# Patient Record
Sex: MALE | Race: WHITE | HISPANIC OR LATINO | Employment: OTHER | URBAN - METROPOLITAN AREA
[De-identification: names, ages, dates, MRNs, and addresses within clinical notes are randomized per-mention and may not be internally consistent; named-entity substitution may affect disease eponyms.]

---

## 2018-01-11 NOTE — PROGRESS NOTES
Assessment    1  Encounter for preventive health examination (V70 0) (Z00 00)   2  Encounter for screening colonoscopy (V76 51) (Z12 11)   3  Screening for cardiovascular condition (V81 2) (Z13 6)   4  Screening for lipid disorders (V77 91) (Z13 220)   5  Screening PSA (prostate specific antigen) (V76 44) (Z12 5)    Plan  Encounter for screening colonoscopy    · COLONOSCOPY; Status:Active; Requested for:31Oct2016;    · 2 - Edenilson WILSON, Jaylene Pandya  (Gastroenterology) Physician Referral  Consult  Status: Active   Requested for: [id number]  Care Summary provided  : Yes  Health Maintenance    · Always use a seat belt and shoulder strap when riding or driving a motor vehicle ;  Status:Complete;   Done: 07WVC8700   · Brush your teeth freq1 and floss at least once a day ; Status:Complete;   Done:  77RNX9352   · Decreasing the stress in your life may help your condition improve ; Status:Complete;    Done: 51SPP1772   · Drink plenty of fluids ; Status:Complete;   Done: 10TYX5193   · Eat a normal well-balanced diet ; Status:Complete;   Done: 55MGV3929   · Limit your use of alcohol to 2 drinks or cans of beer a day ; Status:Complete;   Done:  48DCX8484   · Regular aerobic exercise can help reduce stress ; Status:Complete;   Done: 02ZCY4363   · Use a sun block product with an SPF of 15 or more ; Status:Complete;   Done:  73YBK9017   · We encourage all of our patients to exercise regularly  30 minutes of exercise or physical  activity five or more days a week is recommended for children and adults ;  Status:Complete;   Done: 15GCI3358   · We recommend a colonoscopy ; Status:Complete;   Done: 61SXW7977   · We recommend routine visits to a dentist ; Status:Complete;   Done: 54HJZ2831   · We recommend that you follow these steps to lower your risk of osteoporosis  ;  Status:Complete;   Done: 26TZJ3200  Screening for cardiovascular condition, Screening for lipid disorders, Screening PSA  (prostate specific antigen)    · (1) CBC/PLT/DIFF; Status:Active; Requested for:02Nov2016;    · (1) COMPREHENSIVE METABOLIC PANEL; Status:Active; Requested for:02Nov2016;    · (1) LIPID PANEL, FASTING; Status:Active; Requested for:02Nov2016;    · (1) PSA (SCREEN) (Dx V76 44 Screen for Prostate Cancer); Status:Active; Requested  for:02Nov2016;    · (1) TSH WITH FT4 REFLEX; Status:Active; Requested for:02Nov2016;     Discussion/Summary  Impression: health maintenance visit  Currently, he eats a healthy diet and has an inadequate exercise regimen  Prostate cancer screening: the risks and benefits of prostate cancer screening were discussed  Testicular cancer screening: the risks and benefits of testicular cancer screening were discussed  Colorectal cancer screening: the risks and benefits of colorectal cancer screening were discussed and colonoscopy has been ordered  The risks and benefits of immunizations were discussed  Advice and education were given regarding nutrition, aerobic exercise and cardiovascular risk reduction  Doing well  advised regulare exercise  tdap today  refused flu shot  Chief Complaint  Patient here to establish care and for annual physical      History of Present Illness  HM, Adult Male: The patient is being seen for a health maintenance evaluation  The last health maintenance visit was month(s) ago  General Health: The patient's health since the last visit is described as good  He has regular dental visits  He denies vision problems  He denies hearing loss  Immunizations status: not up to date  Lifestyle:  He consumes a diverse and healthy diet  He does not have any weight concerns  He does not exercise regularly  (works in construction)   He consumes alcohol  He reports occasional alcohol use  Reproductive health:  the patient is sexually active  birth control is not being practiced  He denies erectile dysfunction  Screening: Additional History:  doing well  no new concerns     has not had labs or immunizations  Review of Systems    Constitutional: No fever or chills, feels well, no tiredness, no recent weight gain or weight loss  Eyes: No complaints of eye pain, no red eyes, no discharge from eyes, no itchy eyes  ENT: no complaints of earache, no hearing loss, no nosebleeds, no nasal discharge, no sore throat, no hoarseness  Cardiovascular: No complaints of slow heart rate, no fast heart rate, no chest pain, no palpitations, no leg claudication, no lower extremity  Respiratory: No complaints of shortness of breath, no wheezing, no cough, no SOB on exertion, no orthopnea or PND  Gastrointestinal: No complaints of abdominal pain, no constipation, no nausea or vomiting, no diarrhea or bloody stools  Genitourinary: No complaints of dysuria, no incontinence, no hesitancy, no nocturia, no genital lesion, no testicular pain  Musculoskeletal: No complaints of arthralgia, no myalgias, no joint swelling or stiffness, no limb pain or swelling  Integumentary: No complaints of skin rash or skin lesions, no itching, no skin wound, no dry skin  Active Problems    1  Encounter for screening colonoscopy (V76 51) (Z12 11)    Past Medical History    · Screening for cardiovascular condition (V81 2) (Z13 6)   · Screening for lipid disorders (V77 91) (Z13 220)   · Screening PSA (prostate specific antigen) (V76 44) (Z12 5)    Family History  Family History    · No pertinent family history    Social History    · Non-smoker (V49 89) (Z78 9)    Current Meds   1  No Reported Medications Recorded    Allergies    1   No Known Drug Allergies    Vitals   Recorded: 94ZEY0649 72:38EJ   Systolic 036, LUE, Sitting   Diastolic 74, LUE, Sitting   Heart Rate 64, L Radial   Pulse Quality Norm   Temperature 97 4 F, Tympanic   Height 5 ft 5 3 in   Weight 190 lb 6 08 oz   BMI Calculated 31 39   BSA Calculated 1 94     Physical Exam    Constitutional   General appearance: No acute distress, well appearing and well nourished  Head and Face   Head and face: Normal     Palpation of the face and sinuses: No sinus tenderness  Eyes   Conjunctiva and lids: No erythema, swelling or discharge  Pupils and irises: Equal, round, reactive to light  Ears, Nose, Mouth, and Throat   External inspection of ears and nose: Normal     Otoscopic examination: Tympanic membranes translucent with normal light reflex  Canals patent without erythema  Lips, teeth, and gums: Normal, good dentition  Oropharynx: Normal with no erythema, edema, exudate or lesions  Neck   Neck: Supple, symmetric, trachea midline, no masses  Thyroid: Normal, no thyromegaly  Pulmonary   Respiratory effort: No increased work of breathing or signs of respiratory distress  Palpation of chest: Normal     Auscultation of lungs: Clear to auscultation  Cardiovascular   Palpation of heart: Normal PMI, no thrills  Auscultation of heart: Normal rate and rhythm, normal S1 and S2, no murmurs  Pedal pulses: 2+ bilaterally  Peripheral vascular exam: Normal     Examination of extremities for edema and/or varicosities: Normal     Abdomen   Abdomen: Non-tender, no masses  Liver and spleen: No hepatomegaly or splenomegaly  Lymphatic   Palpation of lymph nodes in neck: No lymphadenopathy  Musculoskeletal   Gait and station: Normal     Muscle strength/tone: Normal     Neurologic   Cortical function: Normal mental status  Coordination: Normal finger to nose and heel to shin  Psychiatric   Orientation to person, place and time: Normal     Mood and affect: Normal        Results/Data  PHQ-2 Adult Depression Screening [id number] 03:27PM User, Keeley     Test Name Result Flag Reference   PHQ-2 Adult Depression Score 0     Over the last two weeks, how often have you been bothered by any of the following problems?   Little interest or pleasure in doing things: Not at all - 0  Feeling down, depressed, or hopeless: Not at all - 0   PHQ-2 Adult Depression Screening Negative         Signatures   Electronically signed by : Woodrow Dennison MD; Nov 2 2016  3:41PM EST                       (Author)

## 2018-01-12 NOTE — RESULT NOTES
Verified Results  (1) CBC/PLT/DIFF [id number] 12:00AM Francisco J Kim     Test Name Result Flag Reference   WBC 6 0 x10E3/uL  3 4-10 8   RBC 5 43 x10E6/uL  4 14-5 80   Hemoglobin 16 5 g/dL  12 6-17 7   Hematocrit 49 3 %  37 5-51 0   MCV 91 fL  79-97   MCH 30 4 pg  26 6-33 0   MCHC 33 5 g/dL  31 5-35 7   RDW 12 7 %  12 3-15 4   Platelets 695 F29C3/AY  150-379   Neutrophils 50 %     Lymphs 33 %     Monocytes 9 %     Eos 7 %     Basos 1 %     Neutrophils (Absolute) 3 0 x10E3/uL  1 4-7 0   Lymphs (Absolute) 2 0 x10E3/uL  0 7-3 1   Monocytes(Absolute) 0 5 x10E3/uL  0 1-0 9   Eos (Absolute) 0 4 x10E3/uL  0 0-0 4   Baso (Absolute) 0 1 x10E3/uL  0 0-0 2   Immature Granulocytes 0 %     Immature Grans (Abs) 0 0 x10E3/uL  0 0-0 1     (1) COMPREHENSIVE METABOLIC PANEL [id number] 92:37YL Francisco J Kim     Test Name Result Flag Reference   Glucose, Serum 105 mg/dL H 65-99   BUN 15 mg/dL  6-24   Creatinine, Serum 0 98 mg/dL  0 76-1 27   eGFR If NonAfricn Am 89 mL/min/1 73  >59   eGFR If Africn Am 103 mL/min/1 73  >59   BUN/Creatinine Ratio 15  9-20   Sodium, Serum 143 mmol/L  136-144   Potassium, Serum 5 0 mmol/L  3 5-5 2   Chloride, Serum 103 mmol/L     Carbon Dioxide, Total 25 mmol/L  18-29   Calcium, Serum 9 5 mg/dL  8 7-10 2   Protein, Total, Serum 7 1 g/dL  6 0-8 5   Albumin, Serum 4 4 g/dL  3 5-5 5   Globulin, Total 2 7 g/dL  1 5-4 5   A/G Ratio 1 6  1 1-2 5   Bilirubin, Total 0 5 mg/dL  0 0-1 2   Alkaline Phosphatase, S 50 IU/L     AST (SGOT) 26 IU/L  0-40   ALT (SGPT) 23 IU/L  0-44     (1) LIPID PANEL, FASTING [id number] 12:00AM Francisco J Kim     Test Name Result Flag Reference   Cholesterol, Total 189 mg/dL  100-199   Triglycerides 108 mg/dL  0-149   HDL Cholesterol 66 mg/dL  >39   According to ATP-III Guidelines, HDL-C >59 mg/dL is considered a  negative risk factor for CHD  VLDL Cholesterol Abhi 22 mg/dL  5-40   LDL Cholesterol Calc 101 mg/dL H 0-99   T   Chol/HDL Ratio 2 9 ratio units 0 0-5 0   T  Chol/HDL Ratio                                                             Men  Women                                               1/2 Avg  Risk  3 4    3 3                                                   Avg Risk  5 0    4 4                                                2X Avg  Risk  9 6    7 1                                                3X Avg  Risk 23 4   11 0     (1) PSA (SCREEN) (Dx V76 44 Screen for Prostate Cancer) [id number] 12:00AM Francisco J Kim     Test Name Result Flag Reference   Prostate Specific Ag, Serum 0 8 ng/mL  0 0-4 0   Roche ECLIA methodology  According to the American Urological Association, Serum PSA should  decrease and remain at undetectable levels after radical  prostatectomy  The AUA defines biochemical recurrence as an initial  PSA value 0 2 ng/mL or greater followed by a subsequent confirmatory  PSA value 0 2 ng/mL or greater  Values obtained with different assay methods or kits cannot be used  interchangeably  Results cannot be interpreted as absolute evidence  of the presence or absence of malignant disease  (1) TSH WITH FT4 REFLEX [id number] 12:00AM JudyFrancisco J     Test Name Result Flag Reference   TSH 1 870 uIU/mL  0 450-4 500       Discussion/Summary    please call    labs are good except slightly elevated blood sugar consistent with prediabetes  recommned watching his die ( low carbohydrate) to prevent diabetes       pt aware ems 11/7/2016

## 2018-03-29 ENCOUNTER — OFFICE VISIT (OUTPATIENT)
Dept: FAMILY MEDICINE CLINIC | Facility: HOSPITAL | Age: 53
End: 2018-03-29
Payer: COMMERCIAL

## 2018-03-29 VITALS
WEIGHT: 191.4 LBS | RESPIRATION RATE: 14 BRPM | SYSTOLIC BLOOD PRESSURE: 136 MMHG | HEART RATE: 64 BPM | HEIGHT: 65 IN | TEMPERATURE: 97.7 F | BODY MASS INDEX: 31.89 KG/M2 | DIASTOLIC BLOOD PRESSURE: 88 MMHG

## 2018-03-29 DIAGNOSIS — K14.8 LESION OF TONGUE: ICD-10-CM

## 2018-03-29 DIAGNOSIS — Z20.5 EXPOSURE TO HEPATITIS C: Primary | ICD-10-CM

## 2018-03-29 PROCEDURE — 99213 OFFICE O/P EST LOW 20 MIN: CPT | Performed by: FAMILY MEDICINE

## 2018-03-29 PROCEDURE — 3008F BODY MASS INDEX DOCD: CPT | Performed by: FAMILY MEDICINE
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
## 2018-03-31 LAB
ALBUMIN SERPL-MCNC: 4.5 G/DL (ref 3.5–5.5)
ALBUMIN/GLOB SERPL: 1.8 {RATIO} (ref 1.2–2.2)
ALP SERPL-CCNC: 48 IU/L (ref 39–117)
ALT SERPL-CCNC: 32 IU/L (ref 0–44)
AST SERPL-CCNC: 26 IU/L (ref 0–40)
BILIRUB SERPL-MCNC: 0.5 MG/DL (ref 0–1.2)
BUN SERPL-MCNC: 15 MG/DL (ref 6–24)
BUN/CREAT SERPL: 15 (ref 9–20)
CALCIUM SERPL-MCNC: 9.4 MG/DL (ref 8.7–10.2)
CHLORIDE SERPL-SCNC: 101 MMOL/L (ref 96–106)
CO2 SERPL-SCNC: 24 MMOL/L (ref 18–29)
CREAT SERPL-MCNC: 0.98 MG/DL (ref 0.76–1.27)
GLOBULIN SER-MCNC: 2.5 G/DL (ref 1.5–4.5)
GLUCOSE SERPL-MCNC: 131 MG/DL (ref 65–99)
HBV SURFACE AG SERPL QL IA: NEGATIVE
HBV SURFACE AG SERPL QL IA: NEGATIVE
HCV AB S/CO SERPL IA: 0.1 S/CO RATIO (ref 0–0.9)
POTASSIUM SERPL-SCNC: 4.2 MMOL/L (ref 3.5–5.2)
PROT SERPL-MCNC: 7 G/DL (ref 6–8.5)
SL AMB EGFR AFRICAN AMERICAN: 101 ML/MIN/1.73
SL AMB EGFR NON AFRICAN AMERICAN: 88 ML/MIN/1.73
SODIUM SERPL-SCNC: 141 MMOL/L (ref 134–144)

## 2018-04-03 LAB
HGB FRACT BLD-IMP: NEGATIVE
HIV1 AB SERPL QL IA: NEGATIVE
SL AMB FINAL INTERPRETATION: NORMAL
SL AMB HIV 1 RNA QUALITATIVE: NEGATIVE
SL AMB HIV 2 AB: NEGATIVE

## 2019-07-03 ENCOUNTER — TELEPHONE (OUTPATIENT)
Dept: FAMILY MEDICINE CLINIC | Facility: HOSPITAL | Age: 54
End: 2019-07-03

## 2019-10-07 ENCOUNTER — TELEPHONE (OUTPATIENT)
Dept: FAMILY MEDICINE CLINIC | Facility: HOSPITAL | Age: 54
End: 2019-10-07

## 2019-12-16 ENCOUNTER — TELEPHONE (OUTPATIENT)
Dept: INTERNAL MEDICINE CLINIC | Facility: CLINIC | Age: 54
End: 2019-12-16

## 2019-12-16 NOTE — TELEPHONE ENCOUNTER
Patient called in regards to scheduling TCM - Admitted to [facility] on 11-8-2019 for CHF - D/C on 12/15/2019 - Was seen by Dr Brennon Kunz and does plan on establishing care but would like to see her  Can you speak w/ Dr Brennon Kunz and see when we can get patient in for an appointment? Thank you!

## 2020-09-05 ENCOUNTER — HOSPITAL ENCOUNTER (EMERGENCY)
Facility: HOSPITAL | Age: 55
Discharge: HOME | End: 2020-09-05
Attending: EMERGENCY MEDICINE
Payer: COMMERCIAL

## 2020-09-05 ENCOUNTER — APPOINTMENT (EMERGENCY)
Dept: CT IMAGING | Facility: HOSPITAL | Age: 55
End: 2020-09-05
Payer: COMMERCIAL

## 2020-09-05 VITALS
WEIGHT: 189 LBS | SYSTOLIC BLOOD PRESSURE: 156 MMHG | BODY MASS INDEX: 31.49 KG/M2 | OXYGEN SATURATION: 97 % | TEMPERATURE: 98.2 F | RESPIRATION RATE: 18 BRPM | HEIGHT: 65 IN | DIASTOLIC BLOOD PRESSURE: 91 MMHG | HEART RATE: 88 BPM

## 2020-09-05 DIAGNOSIS — S01.112A LACERATION OF LEFT EYEBROW: ICD-10-CM

## 2020-09-05 DIAGNOSIS — S00.83XA FACIAL CONTUSION: Primary | ICD-10-CM

## 2020-09-05 PROCEDURE — 12011 RPR F/E/E/N/L/M 2.5 CM/<: CPT | Performed by: PHYSICIAN ASSISTANT

## 2020-09-05 PROCEDURE — G1004 CDSM NDSC: HCPCS

## 2020-09-05 PROCEDURE — 99283 EMERGENCY DEPT VISIT LOW MDM: CPT

## 2020-09-05 PROCEDURE — 99284 EMERGENCY DEPT VISIT MOD MDM: CPT | Performed by: PHYSICIAN ASSISTANT

## 2020-09-05 PROCEDURE — 70486 CT MAXILLOFACIAL W/O DYE: CPT

## 2020-09-05 RX ORDER — GINSENG 100 MG
1 CAPSULE ORAL ONCE
Status: COMPLETED | OUTPATIENT
Start: 2020-09-05 | End: 2020-09-05

## 2020-09-05 RX ORDER — LIDOCAINE HYDROCHLORIDE 10 MG/ML
5 INJECTION, SOLUTION EPIDURAL; INFILTRATION; INTRACAUDAL; PERINEURAL ONCE
Status: COMPLETED | OUTPATIENT
Start: 2020-09-05 | End: 2020-09-05

## 2020-09-05 RX ADMIN — LIDOCAINE HYDROCHLORIDE 5 ML: 10 INJECTION, SOLUTION EPIDURAL; INFILTRATION; INTRACAUDAL; PERINEURAL at 17:44

## 2020-09-05 RX ADMIN — BACITRACIN 1 SMALL APPLICATION: 500 OINTMENT TOPICAL at 17:44

## 2020-09-05 NOTE — ED PROVIDER NOTES
History  Chief Complaint   Patient presents with    Facial Laceration     pt reports that he was cuttnig a tree down and the crow bar hit him in the face cutting open his left eyebrow  left sided blurriness when it first happened  Patient is a 53 y/o M that presents to the ED with laceration left eyebrow that occurred 1 hour ago  He states he was cutting down a tree and hit himself in the head with a crowbar  He states his left eye seemed a little blurry at first, but has improved  No LOC  No other injuries  Last tetanus was in 2016  He has pain over his orbit  History provided by:  Patient  Facial Injury   Mechanism of injury:  Direct blow  Location:  Face  Time since incident:  1 hour  Pain details:     Quality:  Aching    Severity:  Moderate    Duration:  1 hour    Timing:  Constant    Progression:  Unchanged  Foreign body present:  No foreign bodies  Relieved by:  Nothing  Worsened by:  Nothing  Ineffective treatments:  None tried  Associated symptoms: headaches    Associated symptoms: no altered mental status, no congestion, no difficulty breathing, no double vision, no loss of consciousness, no nausea, no neck pain, no rhinorrhea and no vomiting        None       History reviewed  No pertinent past medical history  History reviewed  No pertinent surgical history  Family History   Problem Relation Age of Onset    No Known Problems Family      I have reviewed and agree with the history as documented  E-Cigarette/Vaping     E-Cigarette/Vaping Substances     Social History     Tobacco Use    Smoking status: Never Smoker    Smokeless tobacco: Never Used   Substance Use Topics    Alcohol use: No    Drug use: No       Review of Systems   Constitutional: Negative for chills and fever  HENT: Negative for congestion and rhinorrhea  Eyes: Negative for double vision, photophobia, pain, redness and visual disturbance  Respiratory: Negative for cough and shortness of breath  Cardiovascular: Negative for chest pain and leg swelling  Gastrointestinal: Negative for abdominal pain, nausea and vomiting  Musculoskeletal: Negative for neck pain  Skin: Positive for wound  Neurological: Positive for headaches  Negative for dizziness, loss of consciousness, weakness and numbness  Psychiatric/Behavioral: Negative for confusion and decreased concentration  All other systems reviewed and are negative  Physical Exam  Physical Exam  Vitals signs and nursing note reviewed  Constitutional:       Appearance: Normal appearance  He is well-developed and well-groomed  He is not ill-appearing  HENT:      Head: Normocephalic  Laceration present  Jaw: There is normal jaw occlusion  No tenderness, swelling or pain on movement  Right Ear: Hearing normal       Left Ear: Hearing normal       Nose: Nose normal       Mouth/Throat:      Mouth: Mucous membranes are moist       Pharynx: Oropharynx is clear  Eyes:      General: Lids are normal       Extraocular Movements: Extraocular movements intact  Conjunctiva/sclera: Conjunctivae normal       Pupils: Pupils are equal, round, and reactive to light  Funduscopic exam:     Right eye: No hemorrhage  Left eye: No hemorrhage  Neck:      Musculoskeletal: Normal range of motion and neck supple  No spinous process tenderness or muscular tenderness  Cardiovascular:      Rate and Rhythm: Normal rate and regular rhythm  Heart sounds: Normal heart sounds  No murmur  Pulmonary:      Effort: Pulmonary effort is normal       Breath sounds: Normal breath sounds  No wheezing or rhonchi  Abdominal:      General: Abdomen is flat  Palpations: Abdomen is soft  Tenderness: There is no abdominal tenderness  Musculoskeletal: Normal range of motion  General: No swelling  Skin:     General: Skin is warm and dry  Findings: Laceration (1 5cm vertical laceration to left eyebrow  ) and wound present  Neurological:      General: No focal deficit present  Mental Status: He is alert and oriented to person, place, and time  GCS: GCS eye subscore is 4  GCS verbal subscore is 5  GCS motor subscore is 6  Cranial Nerves: Cranial nerves are intact  Sensory: Sensation is intact  Motor: Motor function is intact  Gait: Gait is intact  Psychiatric:         Mood and Affect: Mood normal          Behavior: Behavior is cooperative  Vital Signs  ED Triage Vitals [09/05/20 1740]   Temperature Pulse Respirations Blood Pressure SpO2   98 2 °F (36 8 °C) 92 18 (!) 186/93 98 %      Temp Source Heart Rate Source Patient Position - Orthostatic VS BP Location FiO2 (%)   Temporal Monitor Sitting Right arm --      Pain Score       --           Vitals:    09/05/20 1740 09/05/20 1745   BP: (!) 186/93 156/91   Pulse: 92 88   Patient Position - Orthostatic VS: Sitting Lying         Visual Acuity  Visual Acuity      Most Recent Value   Visual acuity R eye is  20/20   Visual acuity Left eye is  20/50   Visual acuity in both eyes is  20/20   Wearing corrective eyewear/lenses? No          ED Medications  Medications   lidocaine (PF) (XYLOCAINE-MPF) 1 % injection 5 mL (5 mL Infiltration Given 9/5/20 1744)   bacitracin topical ointment 1 small application (1 small application Topical Given 9/5/20 1744)       Diagnostic Studies  Results Reviewed     None                 CT facial bones without contrast   Final Result by Renea Primrose, MD (09/05 1832)      Left supraorbital soft tissue swelling  No underlying orbital fracture  No globe or retrobulbar injury  Workstation performed: [id number]                    Procedures  Laceration repair    Date/Time: 9/5/2020 6:10 PM  Performed by: Suman Restrepo PA-C  Authorized by: Suman Restrepo PA-C   Consent: Verbal consent obtained    Risks and benefits: risks, benefits and alternatives were discussed  Consent given by: patient  Body area: head/neck  Location details: left eyebrow  Laceration length: 1 5 cm  Foreign bodies: no foreign bodies  Tendon involvement: none  Nerve involvement: none  Vascular damage: no  Anesthesia: local infiltration    Anesthesia:  Local Anesthetic: lidocaine 1% without epinephrine  Anesthetic total: 2 mL      Procedure Details:  Preparation: Patient was prepped and draped in the usual sterile fashion  Irrigation solution: saline  Irrigation method: tap  Amount of cleaning: standard  Debridement: none  Degree of undermining: none  Skin closure: 6-0 nylon  Number of sutures: 5  Technique: simple  Approximation: close  Approximation difficulty: simple  Dressing: antibiotic ointment  Patient tolerance: Patient tolerated the procedure well with no immediate complications               ED Course       US AUDIT      Most Recent Value   Initial Alcohol Screen: US AUDIT-C    1  How often do you have a drink containing alcohol?  0 Filed at: 09/05/2020 1740   2  How many drinks containing alcohol do you have on a typical day you are drinking? 0 Filed at: 09/05/2020 1740   3a  Male UNDER 65: How often do you have five or more drinks on one occasion? 0 Filed at: 09/05/2020 1740   3b  FEMALE Any Age, or MALE 65+: How often do you have 4 or more drinks on one occassion? 0 Filed at: 09/05/2020 1740   Audit-C Score  0 Filed at: 09/05/2020 1740                  JAYANT/DAST-10      Most Recent Value   How many times in the past year have you    Used an illegal drug or used a prescription medication for non-medical reasons? Never Filed at: 09/05/2020 1740                                MDM  Number of Diagnoses or Management Options  Facial contusion: new and requires workup  Laceration of left eyebrow: new and does not require workup  Diagnosis management comments: Patient with left eye contusion, no pain to eye, but has pain to orbit  Will order CT scan to r/o fracture  Tetanus is UTD, will suture laceration    Will refer patient to ophthalmology due to blurred vision for recheck  Amount and/or Complexity of Data Reviewed  Tests in the radiology section of CPT®: ordered and reviewed    Patient Progress  Patient progress: stable        Disposition  Final diagnoses:   Facial contusion   Laceration of left eyebrow     Time reflects when diagnosis was documented in both MDM as applicable and the Disposition within this note     Time User Action Codes Description Comment    9/5/2020  6:39 PM Maurice Richard [place] Facial contusion     9/5/2020  6:39 PM Maurice Richard [[id number]] Laceration of left eyebrow       ED Disposition     ED Disposition Condition Date/Time Comment    Discharge Stable Sat Sep 5, 2020  6:39 PM Josefa Rodriguez discharge to home/self care  Follow-up Information     Follow up With Specialties Details Why Contact Info    Mayela Coronado MD Ophthalmology Call in 2 days For recheck [phone]  12 [address] [address]            There are no discharge medications for this patient  No discharge procedures on file      PDMP Review     None          ED Provider  Electronically Signed by           Luis Ulrich PA-C  09/05/20 3154

## 2020-09-05 NOTE — DISCHARGE INSTRUCTIONS
Rest, ice, elevate head of bed  Tylenol for discomfort  If change in behavior or vomiting return to ER  Keep wound dry for next 24 hours, then clean daily with soap and water and apply antibiotic ointment    Follow up with family doctor in 5-6 days for suture removal

## 2020-09-10 ENCOUNTER — OFFICE VISIT (OUTPATIENT)
Dept: FAMILY MEDICINE CLINIC | Facility: HOSPITAL | Age: 55
End: 2020-09-10
Payer: COMMERCIAL

## 2020-09-10 VITALS
TEMPERATURE: 96.1 F | WEIGHT: 195 LBS | SYSTOLIC BLOOD PRESSURE: 132 MMHG | DIASTOLIC BLOOD PRESSURE: 80 MMHG | BODY MASS INDEX: 32.49 KG/M2 | HEIGHT: 65 IN | HEART RATE: 54 BPM

## 2020-09-10 DIAGNOSIS — Z48.02 ENCOUNTER FOR REMOVAL OF SUTURES: Primary | ICD-10-CM

## 2020-09-10 DIAGNOSIS — S01.81XS: ICD-10-CM

## 2020-09-10 PROCEDURE — 99213 OFFICE O/P EST LOW 20 MIN: CPT | Performed by: FAMILY MEDICINE

## 2020-09-10 PROCEDURE — 3725F SCREEN DEPRESSION PERFORMED: CPT | Performed by: FAMILY MEDICINE

## 2020-09-10 PROCEDURE — 1036F TOBACCO NON-USER: CPT | Performed by: FAMILY MEDICINE

## 2020-09-10 NOTE — PROGRESS NOTES
Suture removal    Date/Time: 9/10/2020 9:24 AM  Performed by: Marie Valencia MD  Authorized by: Marie Valencia MD     Patient location:  Bedside  Consent:     Consent obtained:  Verbal    Consent given by:  Patient    Risks discussed:  Bleeding    Alternatives discussed:  No treatment and delayed treatment  Universal protocol:     Procedure explained and questions answered to patient or proxy's satisfaction: yes    Location:     Laterality:  Left    Location:  Head/neck    Head/neck location:  Eyebrow  Procedure details: Tools used:  Scissors and tweezers    Wound appearance:  No sign(s) of infection, good wound healing and clean    Number of sutures removed:  5  Post-procedure details:     Post-removal:  Antibiotic ointment applied    Patient tolerance of procedure:   Tolerated well, no immediate complications

## 2020-09-10 NOTE — PROGRESS NOTES
Assessment/Plan:      Problem List Items Addressed This Visit     None      Visit Diagnoses     Encounter for removal of sutures    -  Primary    Laceration of skin of face, sequela             Sutures removed without complication  Subjective:   Chief Complaint   Patient presents with    Suture / Staple Removal        Patient ID: Josefa Rodriguez is a 54 y o  male  5 days ago patient was working in the yard and cutting a tree  Crowbar hit his left eyebrow causing a laceration  Was seen in the ER and was repaired  No fever, no chills  No headache, no dizziness  No blurry of vision  No balance problems  No discharge, no pain  The following portions of the patient's history were reviewed and updated as appropriate: allergies, current medications, past family history, past medical history, past social history, past surgical history and problem list     Review of Systems   Constitutional: Negative for activity change, appetite change, fatigue and fever  Objective:  Vitals:    09/10/20 0902   BP: 132/80   Pulse: (!) 54   Temp: (!) 96 1 °F (35 6 °C)   Weight: 88 5 kg (195 lb)   Height: 5' 5" (1 651 m)     BP Readings from Last 6 Encounters:   09/10/20 132/80   09/05/20 156/91   03/29/18 136/88   11/02/16 134/74      Wt Readings from Last 6 Encounters:   09/10/20 88 5 kg (195 lb)   09/05/20 85 7 kg (189 lb)   03/29/18 86 8 kg (191 lb 6 4 oz)   11/02/16 86 4 kg (190 lb 6 1 oz)             Physical Exam  Vitals signs reviewed  Constitutional:       Appearance: Normal appearance  Skin:     Comments: Sutures in place  Skin well coaptated  No tenderness, no fluctuance  Sutures were loose  Neurological:      Mental Status: He is alert  No visits with results within 6 Month(s) from this visit     Latest known visit with results is:   Office Visit on 03/29/2018   Component Date Value Ref Range Status    HBsAg Screen 03/30/2018 Negative  Negative Final  Glucose, Random 03/30/2018 131* 65 - 99 mg/dL Final    BUN 03/30/2018 15  6 - 24 mg/dL Final    Creatinine 03/30/2018 0 98  0 76 - 1 27 mg/dL Final    eGFR Non African American 03/30/2018 88  >59 mL/min/1 73 Final    eGFR African American 03/30/2018 101  >59 mL/min/1 73 Final    SL AMB BUN/CREATININE RATIO 03/30/2018 15  9 - 20 Final    Sodium 03/30/2018 141  134 - 144 mmol/L Final    Potassium 03/30/2018 4 2  3 5 - 5 2 mmol/L Final    Chloride 03/30/2018 101  96 - 106 mmol/L Final    CO2 03/30/2018 24  18 - 29 mmol/L Final    CALCIUM 03/30/2018 9 4  8 7 - 10 2 mg/dL Final    Protein, Total 03/30/2018 7 0  6 0 - 8 5 g/dL Final    Albumin 03/30/2018 4 5  3 5 - 5 5 g/dL Final    Globulin, Total 03/30/2018 2 5  1 5 - 4 5 g/dL Final    Albumin/Globulin Ratio 03/30/2018 1 8  1 2 - 2 2 Final    TOTAL BILIRUBIN 03/30/2018 0 5  0 0 - 1 2 mg/dL Final    Alk Phos Isoenzymes 03/30/2018 48  39 - 117 IU/L Final    AST 03/30/2018 26  0 - 40 IU/L Final    ALT 03/30/2018 32  0 - 44 IU/L Final    HEP C AB 03/30/2018 0 1  0 0 - 0 9 s/co ratio Final    Comment:                                   Negative:     < 0 8                               Indeterminate: 0 8 - 0 9                                    Positive:     > 0 9   The CDC recommends that a positive HCV antibody result   be followed up with a HCV Nucleic Acid Amplification   test (416259)   HBsAg Screen 03/30/2018 Negative  Negative Final    HIV-1 Antibody 03/30/2018 Negative  Negative Final    HIV 2 Ab 03/30/2018 Negative  Negative Final    Interpretation 03/30/2018 Negative   Final    See RNA Reflex   HIV 1 RNA Qualitative 03/30/2018 Negative  Negative Final    Negative for HIV-1 RNA    Final Interpretation 03/30/2018 Comment   Final    Comment: HIV antibodies were not confirmed and HIV 1 RNA was not detected  No  laboratory evidence of HIV 1 infection  Follow-up testing for HIV 2  should be performed if clinically indicated

## 2020-12-24 ENCOUNTER — HOSPITAL ENCOUNTER (EMERGENCY)
Facility: HOSPITAL | Age: 55
Discharge: HOME | End: 2020-12-24
Attending: EMERGENCY MEDICINE | Admitting: EMERGENCY MEDICINE
Payer: COMMERCIAL

## 2020-12-24 VITALS
SYSTOLIC BLOOD PRESSURE: 161 MMHG | HEART RATE: 56 BPM | WEIGHT: 189 LBS | RESPIRATION RATE: 18 BRPM | TEMPERATURE: 98.3 F | OXYGEN SATURATION: 96 % | BODY MASS INDEX: 31.45 KG/M2 | DIASTOLIC BLOOD PRESSURE: 93 MMHG

## 2020-12-24 DIAGNOSIS — I10 HYPERTENSION: ICD-10-CM

## 2020-12-24 DIAGNOSIS — R68.83 CHILLS: ICD-10-CM

## 2020-12-24 DIAGNOSIS — Z20.822 SUSPECTED COVID-19 VIRUS INFECTION: Primary | ICD-10-CM

## 2020-12-24 DIAGNOSIS — R11.0 NAUSEA: ICD-10-CM

## 2020-12-24 PROCEDURE — 99283 EMERGENCY DEPT VISIT LOW MDM: CPT

## 2020-12-24 PROCEDURE — 87637 SARSCOV2&INF A&B&RSV AMP PRB: CPT | Performed by: EMERGENCY MEDICINE

## 2020-12-24 PROCEDURE — 99282 EMERGENCY DEPT VISIT SF MDM: CPT | Performed by: EMERGENCY MEDICINE

## 2020-12-25 LAB
FLUAV RNA NPH QL NAA+PROBE: NOT DETECTED
FLUBV RNA NPH QL NAA+PROBE: NOT DETECTED
RSV RNA NPH QL NAA+PROBE: NOT DETECTED
SARS-COV-2 RNA NPH QL NAA+PROBE: NOT DETECTED

## 2021-03-29 DIAGNOSIS — Z23 ENCOUNTER FOR IMMUNIZATION: ICD-10-CM

## 2021-04-01 ENCOUNTER — IMMUNIZATIONS (OUTPATIENT)
Dept: FAMILY MEDICINE CLINIC | Facility: HOSPITAL | Age: 56
End: 2021-04-01

## 2021-04-01 DIAGNOSIS — Z23 ENCOUNTER FOR IMMUNIZATION: Primary | ICD-10-CM

## 2021-04-01 PROCEDURE — 91300 SARS-COV-2 / COVID-19 MRNA VACCINE (PFIZER-BIONTECH) 30 MCG: CPT

## 2021-04-01 PROCEDURE — 0001A SARS-COV-2 / COVID-19 MRNA VACCINE (PFIZER-BIONTECH) 30 MCG: CPT

## 2021-04-22 ENCOUNTER — IMMUNIZATIONS (OUTPATIENT)
Dept: FAMILY MEDICINE CLINIC | Facility: HOSPITAL | Age: 56
End: 2021-04-22

## 2021-04-22 DIAGNOSIS — Z23 ENCOUNTER FOR IMMUNIZATION: Primary | ICD-10-CM

## 2021-04-22 PROCEDURE — 0002A SARS-COV-2 / COVID-19 MRNA VACCINE (PFIZER-BIONTECH) 30 MCG: CPT

## 2021-04-22 PROCEDURE — 91300 SARS-COV-2 / COVID-19 MRNA VACCINE (PFIZER-BIONTECH) 30 MCG: CPT

## 2022-06-27 ENCOUNTER — TELEPHONE (OUTPATIENT)
Dept: FAMILY MEDICINE CLINIC | Facility: HOSPITAL | Age: 57
End: 2022-06-27

## 2022-08-10 ENCOUNTER — OFFICE VISIT (OUTPATIENT)
Dept: FAMILY MEDICINE CLINIC | Facility: HOSPITAL | Age: 57
End: 2022-08-10
Payer: COMMERCIAL

## 2022-08-10 VITALS
WEIGHT: 189 LBS | BODY MASS INDEX: 31.49 KG/M2 | HEIGHT: 65 IN | DIASTOLIC BLOOD PRESSURE: 78 MMHG | TEMPERATURE: 96.2 F | HEART RATE: 57 BPM | SYSTOLIC BLOOD PRESSURE: 132 MMHG

## 2022-08-10 DIAGNOSIS — Z00.00 ANNUAL PHYSICAL EXAM: Primary | ICD-10-CM

## 2022-08-10 DIAGNOSIS — Z13.6 SCREENING FOR CARDIOVASCULAR CONDITION: ICD-10-CM

## 2022-08-10 DIAGNOSIS — Z13.1 SCREENING FOR DIABETES MELLITUS (DM): ICD-10-CM

## 2022-08-10 DIAGNOSIS — R73.9 HYPERGLYCEMIA: ICD-10-CM

## 2022-08-10 DIAGNOSIS — Z12.5 SCREENING PSA (PROSTATE SPECIFIC ANTIGEN): ICD-10-CM

## 2022-08-10 PROCEDURE — 3725F SCREEN DEPRESSION PERFORMED: CPT | Performed by: FAMILY MEDICINE

## 2022-08-10 PROCEDURE — 99396 PREV VISIT EST AGE 40-64: CPT | Performed by: FAMILY MEDICINE

## 2022-08-10 NOTE — PROGRESS NOTES
[facility] [address]     NAME: Jeffery Copeland  AGE: 62 y o  SEX: male  : 1965     DATE: 2022     Assessment and Plan:     Problem List Items Addressed This Visit    None     Visit Diagnoses     Annual physical exam    -  Primary    Screening for cardiovascular condition        Relevant Orders    CBC    Comprehensive metabolic panel    Lipid Panel with Direct LDL reflex    TSH, 3rd generation with Free T4 reflex    Screening PSA (prostate specific antigen)        Relevant Orders    PSA Total (Reflex To Free)    Screening for diabetes mellitus (DM)        Relevant Orders    TSH, 3rd generation with Free T4 reflex    Hemoglobin A1C    Hyperglycemia        Relevant Orders    Hemoglobin A1C          Immunizations and preventive care screenings were discussed with patient today  Appropriate education was printed on patient's after visit summary  Counseling:  Alcohol/drug use: discussed moderation in alcohol intake, the recommendations for healthy alcohol use, and avoidance of illicit drug use  Dental Health: discussed importance of regular tooth brushing, flossing, and dental visits  Exercise: the importance of regular exercise/physical activity was discussed  Recommend exercise 3-5 times per week for at least 30 minutes  Return in about 1 year (around 8/10/2023)  Chief Complaint:     Chief Complaint   Patient presents with    Annual Exam      History of Present Illness:     Adult Annual Physical   Patient here for a comprehensive physical exam  The patient reports no problems  Diet and Physical Activity  Diet/Nutrition: well balanced diet  Exercise: no formal exercise        Depression Screening  PHQ-2/9 Depression Screening    Little interest or pleasure in doing things: 0 - not at all  Feeling down, depressed, or hopeless: 0 - not at all  PHQ-2 Score: 0  PHQ-2 Interpretation: Negative depression screen       General Health  Sleep: sleeps well  Hearing: normal - bilateral   Vision: no vision problems  Dental: regular dental visits   Health  Symptoms include: none     Review of Systems:     Review of Systems   Constitutional: Negative  Negative for activity change, appetite change, chills and diaphoresis  HENT: Negative for congestion and dental problem  Respiratory: Negative  Negative for apnea, chest tightness, shortness of breath and wheezing  Cardiovascular: Negative  Negative for chest pain, palpitations and leg swelling  Gastrointestinal: Negative  Negative for abdominal distention, abdominal pain, constipation, diarrhea and nausea  Genitourinary: Negative  Negative for difficulty urinating, dysuria and frequency  Past Medical History:     History reviewed  No pertinent past medical history  Past Surgical History:     History reviewed  No pertinent surgical history  Family History:     Family History   Problem Relation Age of Onset    No Known Problems Family       Social History:     Social History     Socioeconomic History    Marital status: /Civil Union     Spouse name: None    Number of children: None    Years of education: None    Highest education level: None   Occupational History    None   Tobacco Use    Smoking status: Never Smoker    Smokeless tobacco: Never Used   Substance and Sexual Activity    Alcohol use: No    Drug use: No    Sexual activity: None   Other Topics Concern    None   Social History Narrative    None     Social Determinants of Health     Financial Resource Strain: Not on file   Food Insecurity: Not on file   Transportation Needs: Not on file   Physical Activity: Not on file   Stress: Not on file   Social Connections: Not on file   Intimate Partner Violence: Not on file   Housing Stability: Not on file      Current Medications:     No current outpatient medications on file       No current facility-administered medications for this visit  Allergies:     No Known Allergies   Physical Exam:     /78   Pulse 57   Temp (!) 96 2 °F (35 7 °C)   Ht 5' 4 5" (1 638 m)   Wt 85 7 kg (189 lb)   BMI 31 94 kg/m²     Physical Exam  Vitals and nursing note reviewed  Constitutional:       General: He is not in acute distress  Appearance: Normal appearance  He is well-developed  He is obese  He is not ill-appearing  HENT:      Head: Normocephalic and atraumatic  Right Ear: Tympanic membrane, ear canal and external ear normal       Left Ear: Tympanic membrane, ear canal and external ear normal       Mouth/Throat:      Mouth: Mucous membranes are moist       Pharynx: Oropharynx is clear  Eyes:      Extraocular Movements: Extraocular movements intact  Conjunctiva/sclera: Conjunctivae normal       Pupils: Pupils are equal, round, and reactive to light  Cardiovascular:      Rate and Rhythm: Normal rate and regular rhythm  Heart sounds: Normal heart sounds  No murmur heard  Pulmonary:      Effort: Pulmonary effort is normal       Breath sounds: Normal breath sounds  Abdominal:      General: Bowel sounds are normal  There is no distension  Palpations: Abdomen is soft  There is no mass  Tenderness: There is no abdominal tenderness  There is no guarding  Hernia: No hernia is present  Genitourinary:     Penis: Normal     Musculoskeletal:         General: Normal range of motion  Cervical back: Normal range of motion and neck supple  Skin:     General: Skin is warm and dry  Capillary Refill: Capillary refill takes less than 2 seconds  Neurological:      General: No focal deficit present  Mental Status: He is alert and oriented to person, place, and time     Psychiatric:         Mood and Affect: Mood normal          Behavior: Behavior normal           Francisco J Kim MD  [place] [id number]

## 2022-08-10 NOTE — PATIENT INSTRUCTIONS

## 2022-08-14 LAB
EST. AVERAGE GLUCOSE BLD GHB EST-MCNC: 111 MG/DL
HBA1C MFR BLD: 5.5 % (ref 4.8–5.6)

## 2022-08-21 LAB
ALBUMIN SERPL-MCNC: 4.4 G/DL (ref 3.8–4.9)
ALBUMIN/GLOB SERPL: 1.7 {RATIO} (ref 1.2–2.2)
ALP SERPL-CCNC: 44 IU/L (ref 44–121)
ALT SERPL-CCNC: 28 IU/L (ref 0–44)
AST SERPL-CCNC: 24 IU/L (ref 0–40)
BILIRUB SERPL-MCNC: 0.8 MG/DL (ref 0–1.2)
BUN SERPL-MCNC: 14 MG/DL (ref 6–24)
BUN/CREAT SERPL: 14 (ref 9–20)
CALCIUM SERPL-MCNC: 9.1 MG/DL (ref 8.7–10.2)
CHLORIDE SERPL-SCNC: 102 MMOL/L (ref 96–106)
CHOLEST SERPL-MCNC: 182 MG/DL (ref 100–199)
CO2 SERPL-SCNC: 24 MMOL/L (ref 20–29)
CREAT SERPL-MCNC: 1.02 MG/DL (ref 0.76–1.27)
EGFR: 86 ML/MIN/1.73
ERYTHROCYTE [DISTWIDTH] IN BLOOD BY AUTOMATED COUNT: 12 % (ref 11.6–15.4)
GLOBULIN SER-MCNC: 2.6 G/DL (ref 1.5–4.5)
GLUCOSE SERPL-MCNC: 108 MG/DL (ref 65–99)
HCT VFR BLD AUTO: 48.8 % (ref 37.5–51)
HDLC SERPL-MCNC: 60 MG/DL
HGB BLD-MCNC: 16.6 G/DL (ref 13–17.7)
LDLC SERPL CALC-MCNC: 109 MG/DL (ref 0–99)
LDLC/HDLC SERPL: 1.8 RATIO (ref 0–3.6)
MCH RBC QN AUTO: 31.9 PG (ref 26.6–33)
MCHC RBC AUTO-ENTMCNC: 34 G/DL (ref 31.5–35.7)
MCV RBC AUTO: 94 FL (ref 79–97)
PLATELET # BLD AUTO: 188 X10E3/UL (ref 150–450)
POTASSIUM SERPL-SCNC: 4.1 MMOL/L (ref 3.5–5.2)
PROT SERPL-MCNC: 7 G/DL (ref 6–8.5)
PSA SERPL-MCNC: 0.6 NG/ML (ref 0–4)
RBC # BLD AUTO: 5.2 X10E6/UL (ref 4.14–5.8)
SL AMB REFLEX CRITERIA: NORMAL
SL AMB VLDL CHOLESTEROL CALC: 13 MG/DL (ref 5–40)
SODIUM SERPL-SCNC: 140 MMOL/L (ref 134–144)
TRIGL SERPL-MCNC: 67 MG/DL (ref 0–149)
TSH SERPL DL<=0.005 MIU/L-ACNC: 1.22 UIU/ML (ref 0.45–4.5)
WBC # BLD AUTO: 5.2 X10E3/UL (ref 3.4–10.8)

## 2023-05-09 ENCOUNTER — VBI (OUTPATIENT)
Dept: ADMINISTRATIVE | Facility: OTHER | Age: 58
End: 2023-05-09

## 2025-07-22 ENCOUNTER — OFFICE VISIT (OUTPATIENT)
Dept: FAMILY MEDICINE CLINIC | Facility: CLINIC | Age: 60
End: 2025-07-22
Payer: COMMERCIAL

## 2025-07-22 VITALS
BODY MASS INDEX: 31.47 KG/M2 | SYSTOLIC BLOOD PRESSURE: 152 MMHG | HEIGHT: 64 IN | WEIGHT: 184.3 LBS | DIASTOLIC BLOOD PRESSURE: 100 MMHG | HEART RATE: 68 BPM | OXYGEN SATURATION: 98 % | RESPIRATION RATE: 17 BRPM | TEMPERATURE: 98.2 F

## 2025-07-22 DIAGNOSIS — Z00.00 ANNUAL PHYSICAL EXAM: Primary | ICD-10-CM

## 2025-07-22 DIAGNOSIS — Z13.1 SCREENING FOR DIABETES MELLITUS: ICD-10-CM

## 2025-07-22 DIAGNOSIS — Z12.5 SCREENING PSA (PROSTATE SPECIFIC ANTIGEN): ICD-10-CM

## 2025-07-22 DIAGNOSIS — Z13.228 SCREENING FOR METABOLIC DISORDER: ICD-10-CM

## 2025-07-22 DIAGNOSIS — Z13.21 ENCOUNTER FOR SCREENING FOR NUTRITIONAL DISORDER: ICD-10-CM

## 2025-07-22 DIAGNOSIS — Z13.220 SCREENING FOR LIPID DISORDERS: ICD-10-CM

## 2025-07-22 DIAGNOSIS — Z13.29 SCREENING FOR THYROID DISORDER: ICD-10-CM

## 2025-07-22 DIAGNOSIS — Z13.0 SCREENING FOR BLOOD DISEASE: ICD-10-CM

## 2025-07-22 DIAGNOSIS — R03.0 ELEVATED BLOOD PRESSURE READING: ICD-10-CM

## 2025-07-22 PROCEDURE — 99386 PREV VISIT NEW AGE 40-64: CPT | Performed by: STUDENT IN AN ORGANIZED HEALTH CARE EDUCATION/TRAINING PROGRAM

## 2025-07-22 NOTE — PROGRESS NOTES
Adult Annual Physical  Name: Maria Del Carmen Julian      : 1965      MRN: [id number]  Encounter Provider: Charu Lew MD  Encounter Date: 2025   Encounter department: [facility]    :  Assessment & Plan  Screening for metabolic disorder    Orders:    Comprehensive metabolic panel; Future    Screening for diabetes mellitus    Orders:    Hemoglobin A1C; Future    Screening for blood disease    Orders:    CBC and differential; Future    Screening for lipid disorders    Orders:    Lipid panel; Future    Screening for thyroid disorder    Orders:    TSH, 3rd generation with Free T4 reflex; Future    Encounter for screening for nutritional disorder    Orders:    Vitamin D 25 hydroxy; Future    Screening PSA (prostate specific antigen)    Orders:    PSA, Total Screen; Future    Annual physical exam    Orders:    Comprehensive metabolic panel; Future    Hemoglobin A1C; Future    CBC and differential; Future    Lipid panel; Future    TSH, 3rd generation with Free T4 reflex; Future    Vitamin D 25 hydroxy; Future    PSA, Total Screen; Future    Elevated blood pressure reading  Discussed with patient blood pressure elevated during visit however has not had blood pressure check in quite some time discussed with patient to monitor blood pressure at home about 2-3 times weekly and write on blood pressure log provided  To bring at next appointment  Will discuss whether medication needed           Depression Screening and Follow-up Plan: Patient was screened for depression during today's encounter. They screened negative with a PHQ-2 score of 0.        History of Present Illness     Adult Annual Physical:  Patient presents for annual physical.     Diet and Physical Activity:  - Diet/Nutrition: frequent junk food and well balanced diet.  - Exercise: walking.    Depression Screening:  - PHQ-2 Score: 0    General Health:  - Sleep: sleeps well and snores loudly.  - Hearing: normal  "hearing bilateral ears.  - Vision: no vision problems, wears glasses and most recent eye exam > 1 year ago.  - Dental: no dental visits for > 1 year, brushes teeth twice daily and floss regularly.    /GYN Health:    - History of STDs: no     Health:  - History of STDs: no.     Review of Systems   Constitutional:  Negative for appetite change and unexpected weight change.   HENT:  Negative for congestion.    Respiratory:  Negative for chest tightness and shortness of breath.    Cardiovascular:  Negative for chest pain.   Gastrointestinal:  Negative for abdominal pain.   Genitourinary:  Negative for dysuria.   Skin:  Negative for rash.   Neurological:  Negative for dizziness and headaches.     Past Medical History   Past Medical History[1]  Past Surgical History[2]  Family History[3]   reports that he has never smoked. He has never used smokeless tobacco. He reports that he does not drink alcohol and does not use drugs.  No current outpatient medicationsAllergies[4]   Medications Ordered Prior to Encounter[5]   Social History[6]    Objective   /100 (Patient Position: Sitting, Cuff Size: Standard)   Pulse 68   Temp 98.2 °F (36.8 °C) (Temporal)   Resp 17   Ht 5' 4\" (1.626 m)   Wt 83.6 kg (184 lb 4.8 oz)   SpO2 98%   BMI 31.64 kg/m²     Physical Exam  Vitals reviewed.   Constitutional:       General: He is not in acute distress.  HENT:      Head: Normocephalic and atraumatic.      Right Ear: Tympanic membrane and ear canal normal.      Left Ear: Tympanic membrane and ear canal normal.      Mouth/Throat:      Mouth: Mucous membranes are moist.      Pharynx: Oropharynx is clear.     Cardiovascular:      Rate and Rhythm: Normal rate.      Pulses: Normal pulses.   Pulmonary:      Effort: Pulmonary effort is normal.      Breath sounds: Normal breath sounds. No wheezing.   Abdominal:      Palpations: Abdomen is soft.      Tenderness: There is no abdominal tenderness.     Musculoskeletal:      Right lower leg: " No edema.      Left lower leg: No edema.     Neurological:      Mental Status: He is alert.     Psychiatric:         Mood and Affect: Mood normal.       Administrative Statements   I have spent a total time of 30 minutes in caring for this patient on the day of the visit/encounter including Risks and benefits of tx options, Importance of tx compliance, Impressions, Documenting in the medical record, and Obtaining or reviewing history  .       [1] No past medical history on file.  [2]   Past Surgical History:  Procedure Laterality Date    WISDOM TOOTH EXTRACTION     [3]   Family History  Problem Relation Name Age of Onset    No Known Problems Family      Dementia Mother Shae    [4] No Known Allergies  [5]   No current outpatient medications on file prior to visit.     No current facility-administered medications on file prior to visit.   [6]   Social History  Tobacco Use    Smoking status: Never    Smokeless tobacco: Never   Vaping Use    Vaping status: Never Used   Substance and Sexual Activity    Alcohol use: No    Drug use: No

## 2025-08-01 ENCOUNTER — RESULTS FOLLOW-UP (OUTPATIENT)
Dept: FAMILY MEDICINE CLINIC | Facility: CLINIC | Age: 60
End: 2025-08-01

## 2025-08-01 DIAGNOSIS — E55.9 VITAMIN D DEFICIENCY: Primary | ICD-10-CM

## 2025-08-01 LAB
25(OH)D3+25(OH)D2 SERPL-MCNC: 13 NG/ML (ref 30–100)
ALBUMIN SERPL-MCNC: 4.7 G/DL (ref 3.8–4.9)
ALP SERPL-CCNC: 47 IU/L (ref 44–121)
ALT SERPL-CCNC: 39 IU/L (ref 0–44)
AST SERPL-CCNC: 24 IU/L (ref 0–40)
BASOPHILS # BLD AUTO: 0.1 X10E3/UL (ref 0–0.2)
BASOPHILS NFR BLD AUTO: 1 %
BILIRUB SERPL-MCNC: 0.7 MG/DL (ref 0–1.2)
BUN SERPL-MCNC: 14 MG/DL (ref 8–27)
BUN/CREAT SERPL: 15 (ref 10–24)
CALCIUM SERPL-MCNC: 9.5 MG/DL (ref 8.6–10.2)
CHLORIDE SERPL-SCNC: 102 MMOL/L (ref 96–106)
CHOLEST SERPL-MCNC: 188 MG/DL (ref 100–199)
CHOLEST/HDLC SERPL: 2.8 RATIO (ref 0–5)
CO2 SERPL-SCNC: 24 MMOL/L (ref 20–29)
CREAT SERPL-MCNC: 0.92 MG/DL (ref 0.76–1.27)
EGFR: 95 ML/MIN/1.73
EOSINOPHIL # BLD AUTO: 0.3 X10E3/UL (ref 0–0.4)
EOSINOPHIL NFR BLD AUTO: 5 %
ERYTHROCYTE [DISTWIDTH] IN BLOOD BY AUTOMATED COUNT: 12 % (ref 11.6–15.4)
EST. AVERAGE GLUCOSE BLD GHB EST-MCNC: 108 MG/DL
GLOBULIN SER-MCNC: 2.5 G/DL (ref 1.5–4.5)
GLUCOSE SERPL-MCNC: 94 MG/DL (ref 70–99)
HBA1C MFR BLD: 5.4 % (ref 4.8–5.6)
HCT VFR BLD AUTO: 51.1 % (ref 37.5–51)
HDLC SERPL-MCNC: 67 MG/DL
HGB BLD-MCNC: 17 G/DL (ref 13–17.7)
IMM GRANULOCYTES # BLD: 0 X10E3/UL (ref 0–0.1)
IMM GRANULOCYTES NFR BLD: 0 %
LDLC SERPL CALC-MCNC: 103 MG/DL (ref 0–99)
LYMPHOCYTES # BLD AUTO: 1.7 X10E3/UL (ref 0.7–3.1)
LYMPHOCYTES NFR BLD AUTO: 28 %
MCH RBC QN AUTO: 31.3 PG (ref 26.6–33)
MCHC RBC AUTO-ENTMCNC: 33.3 G/DL (ref 31.5–35.7)
MCV RBC AUTO: 94 FL (ref 79–97)
MONOCYTES # BLD AUTO: 0.4 X10E3/UL (ref 0.1–0.9)
MONOCYTES NFR BLD AUTO: 7 %
NEUTROPHILS # BLD AUTO: 3.6 X10E3/UL (ref 1.4–7)
NEUTROPHILS NFR BLD AUTO: 59 %
PLATELET # BLD AUTO: 193 X10E3/UL (ref 150–450)
POTASSIUM SERPL-SCNC: 4.2 MMOL/L (ref 3.5–5.2)
PROT SERPL-MCNC: 7.2 G/DL (ref 6–8.5)
RBC # BLD AUTO: 5.44 X10E6/UL (ref 4.14–5.8)
SL AMB VLDL CHOLESTEROL CALC: 18 MG/DL (ref 5–40)
SODIUM SERPL-SCNC: 141 MMOL/L (ref 134–144)
TRIGL SERPL-MCNC: 102 MG/DL (ref 0–149)
TSH SERPL DL<=0.005 MIU/L-ACNC: 1.68 UIU/ML (ref 0.45–4.5)
WBC # BLD AUTO: 6.1 X10E3/UL (ref 3.4–10.8)

## 2025-08-01 RX ORDER — ERGOCALCIFEROL 1.25 MG/1
50000 CAPSULE, LIQUID FILLED ORAL WEEKLY
Qty: 12 CAPSULE | Refills: 0 | Status: SHIPPED | OUTPATIENT
Start: 2025-08-01 | End: 2025-10-30

## 2025-08-14 ENCOUNTER — OFFICE VISIT (OUTPATIENT)
Dept: FAMILY MEDICINE CLINIC | Facility: CLINIC | Age: 60
End: 2025-08-14
Payer: COMMERCIAL

## 2025-08-14 VITALS
WEIGHT: 189.8 LBS | BODY MASS INDEX: 32.4 KG/M2 | RESPIRATION RATE: 17 BRPM | TEMPERATURE: 97.7 F | HEART RATE: 53 BPM | OXYGEN SATURATION: 97 % | DIASTOLIC BLOOD PRESSURE: 82 MMHG | SYSTOLIC BLOOD PRESSURE: 140 MMHG | HEIGHT: 64 IN

## 2025-08-14 DIAGNOSIS — E55.9 VITAMIN D DEFICIENCY: ICD-10-CM

## 2025-08-14 DIAGNOSIS — R03.0 ELEVATED BLOOD PRESSURE READING: Primary | ICD-10-CM

## 2025-08-14 PROCEDURE — 99214 OFFICE O/P EST MOD 30 MIN: CPT | Performed by: STUDENT IN AN ORGANIZED HEALTH CARE EDUCATION/TRAINING PROGRAM

## 2025-08-18 ENCOUNTER — TELEPHONE (OUTPATIENT)
Age: 60
End: 2025-08-18

## 2025-08-22 LAB — PSA SERPL-MCNC: 0.6 NG/ML (ref 0–4)
